# Patient Record
Sex: MALE | Race: WHITE | NOT HISPANIC OR LATINO | Employment: FULL TIME | URBAN - METROPOLITAN AREA
[De-identification: names, ages, dates, MRNs, and addresses within clinical notes are randomized per-mention and may not be internally consistent; named-entity substitution may affect disease eponyms.]

---

## 2023-01-03 ENCOUNTER — TELEPHONE (OUTPATIENT)
Dept: RHEUMATOLOGY | Facility: CLINIC | Age: 48
End: 2023-01-03
Payer: OTHER GOVERNMENT

## 2023-01-03 NOTE — TELEPHONE ENCOUNTER
Will contact patient when referral is received and reviewed  ----- Message from Desiree Sarah sent at 1/3/2023  1:55 PM CST -----  Type:  Sooner Apoointment Request    Caller is requesting a sooner appointment.  Caller declined first available appointment listed below.  Caller will not accept being placed on the waitlist and is requesting a message be sent to doctor.  Name of Caller:pt  When is the first available appointment?NA  Symptoms:new pt appt referred for abnormal lab results  Would the patient rather a call back or a response via [facility]? Call  Best Call Back Number:[phone]  Additional Information: referral is on its way

## 2023-05-30 ENCOUNTER — TELEPHONE (OUTPATIENT)
Dept: RHEUMATOLOGY | Facility: CLINIC | Age: 48
End: 2023-05-30

## 2023-05-30 ENCOUNTER — OFFICE VISIT (OUTPATIENT)
Dept: RHEUMATOLOGY | Facility: CLINIC | Age: 48
End: 2023-05-30
Payer: OTHER GOVERNMENT

## 2023-05-30 ENCOUNTER — LAB VISIT (OUTPATIENT)
Dept: LAB | Facility: HOSPITAL | Age: 48
End: 2023-05-30
Attending: INTERNAL MEDICINE
Payer: OTHER GOVERNMENT

## 2023-05-30 VITALS
HEART RATE: 57 BPM | SYSTOLIC BLOOD PRESSURE: 147 MMHG | WEIGHT: 196.88 LBS | HEIGHT: 67 IN | DIASTOLIC BLOOD PRESSURE: 94 MMHG | BODY MASS INDEX: 30.9 KG/M2

## 2023-05-30 DIAGNOSIS — F41.9 ANXIETY: ICD-10-CM

## 2023-05-30 DIAGNOSIS — G43.109 MIGRAINE WITH AURA AND WITHOUT STATUS MIGRAINOSUS, NOT INTRACTABLE: ICD-10-CM

## 2023-05-30 DIAGNOSIS — M79.7 FIBROMYALGIA: ICD-10-CM

## 2023-05-30 DIAGNOSIS — I83.90 VARICOSE VEINS OF LOWER EXTREMITY, UNSPECIFIED LATERALITY, UNSPECIFIED WHETHER COMPLICATED: ICD-10-CM

## 2023-05-30 DIAGNOSIS — M35.7 BENIGN JOINT HYPERMOBILITY SYNDROME: ICD-10-CM

## 2023-05-30 DIAGNOSIS — I83.813 VARICOSE VEINS OF BOTH LOWER EXTREMITIES WITH PAIN: ICD-10-CM

## 2023-05-30 DIAGNOSIS — R74.8 ELEVATED CK: Primary | ICD-10-CM

## 2023-05-30 DIAGNOSIS — F32.A DEPRESSION, UNSPECIFIED DEPRESSION TYPE: ICD-10-CM

## 2023-05-30 DIAGNOSIS — R76.8 POSITIVE ANA (ANTINUCLEAR ANTIBODY): ICD-10-CM

## 2023-05-30 DIAGNOSIS — M35.7 BENIGN HYPERMOBILITY SYNDROME: ICD-10-CM

## 2023-05-30 DIAGNOSIS — R76.8 POSITIVE ANA (ANTINUCLEAR ANTIBODY): Primary | ICD-10-CM

## 2023-05-30 DIAGNOSIS — M79.10 MYALGIA: ICD-10-CM

## 2023-05-30 PROBLEM — G43.909 MIGRAINES: Status: ACTIVE | Noted: 2023-05-30

## 2023-05-30 LAB
ALBUMIN SERPL BCP-MCNC: 4.3 G/DL (ref 3.5–5.2)
ALP SERPL-CCNC: 69 U/L (ref 55–135)
ALT SERPL W/O P-5'-P-CCNC: 59 U/L (ref 10–44)
ANION GAP SERPL CALC-SCNC: 10 MMOL/L (ref 8–16)
AST SERPL-CCNC: 40 U/L (ref 10–40)
BASOPHILS # BLD AUTO: 0.04 K/UL (ref 0–0.2)
BASOPHILS NFR BLD: 0.7 % (ref 0–1.9)
BILIRUB SERPL-MCNC: 0.5 MG/DL (ref 0.1–1)
BUN SERPL-MCNC: 12 MG/DL (ref 6–20)
C3 SERPL-MCNC: 134 MG/DL (ref 50–180)
C4 SERPL-MCNC: 26 MG/DL (ref 11–44)
CALCIUM SERPL-MCNC: 9.6 MG/DL (ref 8.7–10.5)
CCP AB SER IA-ACNC: 0.6 U/ML
CHLORIDE SERPL-SCNC: 106 MMOL/L (ref 95–110)
CK SERPL-CCNC: 702 U/L (ref 20–200)
CO2 SERPL-SCNC: 23 MMOL/L (ref 23–29)
CREAT SERPL-MCNC: 0.9 MG/DL (ref 0.5–1.4)
CRP SERPL-MCNC: 2.1 MG/L (ref 0–8.2)
DAT IGG-SP REAG RBC-IMP: NORMAL
DIFFERENTIAL METHOD: ABNORMAL
EOSINOPHIL # BLD AUTO: 0.1 K/UL (ref 0–0.5)
EOSINOPHIL NFR BLD: 1.2 % (ref 0–8)
ERYTHROCYTE [DISTWIDTH] IN BLOOD BY AUTOMATED COUNT: 12 % (ref 11.5–14.5)
ERYTHROCYTE [SEDIMENTATION RATE] IN BLOOD BY PHOTOMETRIC METHOD: 7 MM/HR (ref 0–23)
EST. GFR  (NO RACE VARIABLE): >60 ML/MIN/1.73 M^2
GLUCOSE SERPL-MCNC: 97 MG/DL (ref 70–110)
HBV CORE AB SERPL QL IA: NORMAL
HBV SURFACE AB SER-ACNC: 37.59 MIU/ML
HBV SURFACE AB SER-ACNC: REACTIVE M[IU]/ML
HBV SURFACE AG SERPL QL IA: NORMAL
HCT VFR BLD AUTO: 40.2 % (ref 40–54)
HCV AB SERPL QL IA: NORMAL
HGB BLD-MCNC: 13.7 G/DL (ref 14–18)
HIV 1+2 AB+HIV1 P24 AG SERPL QL IA: NORMAL
IGA SERPL-MCNC: 157 MG/DL (ref 40–350)
IGG SERPL-MCNC: 1000 MG/DL (ref 650–1600)
IGM SERPL-MCNC: 42 MG/DL (ref 50–300)
IMM GRANULOCYTES # BLD AUTO: 0.02 K/UL (ref 0–0.04)
IMM GRANULOCYTES NFR BLD AUTO: 0.4 % (ref 0–0.5)
LYMPHOCYTES # BLD AUTO: 1.6 K/UL (ref 1–4.8)
LYMPHOCYTES NFR BLD: 29 % (ref 18–48)
MCH RBC QN AUTO: 30 PG (ref 27–31)
MCHC RBC AUTO-ENTMCNC: 34.1 G/DL (ref 32–36)
MCV RBC AUTO: 88 FL (ref 82–98)
MONOCYTES # BLD AUTO: 0.5 K/UL (ref 0.3–1)
MONOCYTES NFR BLD: 8.1 % (ref 4–15)
NEUTROPHILS # BLD AUTO: 3.4 K/UL (ref 1.8–7.7)
NEUTROPHILS NFR BLD: 60.6 % (ref 38–73)
NRBC BLD-RTO: 0 /100 WBC
PLATELET # BLD AUTO: 221 K/UL (ref 150–450)
PMV BLD AUTO: 9.2 FL (ref 9.2–12.9)
POTASSIUM SERPL-SCNC: 3.8 MMOL/L (ref 3.5–5.1)
PROT SERPL-MCNC: 7.1 G/DL (ref 6–8.4)
RBC # BLD AUTO: 4.57 M/UL (ref 4.6–6.2)
SODIUM SERPL-SCNC: 139 MMOL/L (ref 136–145)
WBC # BLD AUTO: 5.65 K/UL (ref 3.9–12.7)

## 2023-05-30 PROCEDURE — 86160 COMPLEMENT ANTIGEN: CPT | Performed by: INTERNAL MEDICINE

## 2023-05-30 PROCEDURE — 85730 THROMBOPLASTIN TIME PARTIAL: CPT | Performed by: INTERNAL MEDICINE

## 2023-05-30 PROCEDURE — 86592 SYPHILIS TEST NON-TREP QUAL: CPT | Performed by: INTERNAL MEDICINE

## 2023-05-30 PROCEDURE — 86038 ANTINUCLEAR ANTIBODIES: CPT | Performed by: INTERNAL MEDICINE

## 2023-05-30 PROCEDURE — 86140 C-REACTIVE PROTEIN: CPT | Performed by: INTERNAL MEDICINE

## 2023-05-30 PROCEDURE — 99215 OFFICE O/P EST HI 40 MIN: CPT | Mod: PBBFAC | Performed by: INTERNAL MEDICINE

## 2023-05-30 PROCEDURE — 82784 ASSAY IGA/IGD/IGG/IGM EACH: CPT | Mod: 59 | Performed by: INTERNAL MEDICINE

## 2023-05-30 PROCEDURE — 99999 PR PBB SHADOW E&M-EST. PATIENT-LVL V: ICD-10-PCS | Mod: PBBFAC,,, | Performed by: INTERNAL MEDICINE

## 2023-05-30 PROCEDURE — 86147 CARDIOLIPIN ANTIBODY EA IG: CPT | Performed by: INTERNAL MEDICINE

## 2023-05-30 PROCEDURE — 85613 RUSSELL VIPER VENOM DILUTED: CPT | Performed by: INTERNAL MEDICINE

## 2023-05-30 PROCEDURE — 86880 COOMBS TEST DIRECT: CPT | Performed by: INTERNAL MEDICINE

## 2023-05-30 PROCEDURE — 99999 PR PBB SHADOW E&M-EST. PATIENT-LVL V: CPT | Mod: PBBFAC,,, | Performed by: INTERNAL MEDICINE

## 2023-05-30 PROCEDURE — 87389 HIV-1 AG W/HIV-1&-2 AB AG IA: CPT | Performed by: INTERNAL MEDICINE

## 2023-05-30 PROCEDURE — 99204 PR OFFICE/OUTPT VISIT, NEW, LEVL IV, 45-59 MIN: ICD-10-PCS | Mod: S$PBB,,, | Performed by: INTERNAL MEDICINE

## 2023-05-30 PROCEDURE — 86160 COMPLEMENT ANTIGEN: CPT | Mod: 59 | Performed by: INTERNAL MEDICINE

## 2023-05-30 PROCEDURE — 85652 RBC SED RATE AUTOMATED: CPT | Performed by: INTERNAL MEDICINE

## 2023-05-30 PROCEDURE — 86803 HEPATITIS C AB TEST: CPT | Performed by: INTERNAL MEDICINE

## 2023-05-30 PROCEDURE — 86200 CCP ANTIBODY: CPT | Performed by: INTERNAL MEDICINE

## 2023-05-30 PROCEDURE — 86162 COMPLEMENT TOTAL (CH50): CPT | Performed by: INTERNAL MEDICINE

## 2023-05-30 PROCEDURE — 86704 HEP B CORE ANTIBODY TOTAL: CPT | Performed by: INTERNAL MEDICINE

## 2023-05-30 PROCEDURE — 80053 COMPREHEN METABOLIC PANEL: CPT | Performed by: INTERNAL MEDICINE

## 2023-05-30 PROCEDURE — 82085 ASSAY OF ALDOLASE: CPT | Performed by: INTERNAL MEDICINE

## 2023-05-30 PROCEDURE — 86334 IMMUNOFIX E-PHORESIS SERUM: CPT | Mod: 26,,, | Performed by: PATHOLOGY

## 2023-05-30 PROCEDURE — 86334 PATHOLOGIST INTERPRETATION IFE: ICD-10-PCS | Mod: 26,,, | Performed by: PATHOLOGY

## 2023-05-30 PROCEDURE — 85025 COMPLETE CBC W/AUTO DIFF WBC: CPT | Performed by: INTERNAL MEDICINE

## 2023-05-30 PROCEDURE — 86706 HEP B SURFACE ANTIBODY: CPT | Performed by: INTERNAL MEDICINE

## 2023-05-30 PROCEDURE — 86334 IMMUNOFIX E-PHORESIS SERUM: CPT | Performed by: INTERNAL MEDICINE

## 2023-05-30 PROCEDURE — 99204 OFFICE O/P NEW MOD 45 MIN: CPT | Mod: S$PBB,,, | Performed by: INTERNAL MEDICINE

## 2023-05-30 PROCEDURE — 87340 HEPATITIS B SURFACE AG IA: CPT | Performed by: INTERNAL MEDICINE

## 2023-05-30 PROCEDURE — 86480 TB TEST CELL IMMUN MEASURE: CPT | Performed by: INTERNAL MEDICINE

## 2023-05-30 PROCEDURE — 86146 BETA-2 GLYCOPROTEIN ANTIBODY: CPT | Mod: 59 | Performed by: INTERNAL MEDICINE

## 2023-05-30 PROCEDURE — 86235 NUCLEAR ANTIGEN ANTIBODY: CPT | Performed by: INTERNAL MEDICINE

## 2023-05-30 PROCEDURE — 82550 ASSAY OF CK (CPK): CPT | Performed by: INTERNAL MEDICINE

## 2023-05-30 RX ORDER — ESCITALOPRAM OXALATE 20 MG/1
20 TABLET ORAL
COMMUNITY
Start: 2023-03-13

## 2023-05-30 RX ORDER — AMITRIPTYLINE HYDROCHLORIDE 25 MG/1
25 TABLET, FILM COATED ORAL
COMMUNITY
Start: 2023-04-21 | End: 2023-09-18

## 2023-05-30 RX ORDER — AMLODIPINE BESYLATE 5 MG/1
5 TABLET ORAL
COMMUNITY
Start: 2023-04-29 | End: 2023-09-18

## 2023-05-30 ASSESSMENT — ROUTINE ASSESSMENT OF PATIENT INDEX DATA (RAPID3)
PSYCHOLOGICAL DISTRESS SCORE: 8.8
PAIN SCORE: 8
PATIENT GLOBAL ASSESSMENT SCORE: 6
TOTAL RAPID3 SCORE: 5.55
FATIGUE SCORE: 5
AM STIFFNESS SCORE: 1, YES
WHEN YOU AWAKENED IN THE MORNING OVER THE LAST WEEK, PLEASE INDICATE THE AMOUNT OF TIME IT TAKES UNTIL YOU ARE AS LIMBER AS YOU WILL BE FOR THE DAY: 30 MIN
MDHAQ FUNCTION SCORE: 0.8

## 2023-05-30 NOTE — PROGRESS NOTES
"Subjective:      Patient ID: Dustin Pabon is a 48 y.o. male.    Chief Complaint: ELIZABETH+ Disease Management    Patient was referred by Dr. Chawla for ELIZABETH(+), RF(-), burning pain and paresthesias to extremities distal starting at elbows and knees. Labs from 12/22/2022 showed unremarkable CBC, CMP. Also within normal limits were ESR, CRP, TSH, Vitamin B12, Folate, Serum Protein Electrophoresis, A1c 5.4.    He describes waking up with pain to wrists, hands, and legs with paresthesias and "fall asleep"  when he sits or stands too long. This has been going on for about 12-18 months, occurs daily. The leg paresthesias will take a while to resolve once he walks around. Knots to Left lateral leg and Right medial leg if sits too long or if he works out, runs; they go away when he changes his activity. Has not seen vascular surgery but does follow with cardiology for HTN. Muscle spasm to bilateral calves when walks. His hands have been swelling, even when he has been on low sodium. Neck and shoulder stiffness that is worse in morning but happens during the day as well; resolves with stretching. He has not had EMG/NCS. Headaches are improved on Amitriptyline.     Exercise: He is very active and exercises every day.   Diet: Low sodium                                  Widespread pain index  Note the areas which the patient has had pain over the last week:                   Shoulder-girdle, left               Shoulder-girdle, right 1                         Upper arm left                       Upper arm right                         Lower arm left 1                       Lower arm right 1    Hip (buttock, trochanter) left  Hip (buttock, trochanter) right                           Upper leg, left 1                         Upper leg, right                           Lower leg, left 1                         Lower leg, right 1                                     Jaw, left                                   Jaw, right                  "                       Chest                                  Abdomen                               Upper back                              Lower back 1                                        Neck 1  Score will be from 0-19: 8                                         Symptom severity score  Fatigue  1  Waking Unrefreshed 2  Cognitive Symptoms 2   0 = no problem, 1=slight or mild problem 2= moderate; considerable problems often present and/or at a moderate level, 3 = severe, pervasive, continuous, life disturbing problem  For each of the 3 symptoms, indicate the level of severity over the past week using the Scale.  The symptom severity score is the sum of the severity of the 3 symptoms (fatigue, waking unrefreshed, and cognitive symptoms) plus the number of the following symptoms occurring during the previous 6 months:   Headaches 1  Pain or cramps in the lower abdomen 0  Depression 1  The final score is between 0 and 12 : 7                                          Criteria  Patient has fibromyalgia if the following 3 conditions are met:  1.  Widespread pain index greater than or equal to 7 and symptom severity score greater than or equal to 5 or widespread pain index between 3- 6, and symptom severity score greater than or equal to 9.    2.  Symptoms have been present in a similar level for at least 3 months  3.  The patient does not have a disorder that would otherwise sufficiently explain the pain     Review of Systems   Constitutional:  Positive for fatigue. Negative for fever.   HENT:  Negative for rhinorrhea, sneezing and sore throat.    Cardiovascular:  Positive for leg swelling.   Gastrointestinal:  Negative for constipation, diarrhea, nausea and vomiting.   Endocrine: Positive for heat intolerance. Negative for cold intolerance.   Genitourinary:  Negative for dysuria and hematuria.   Musculoskeletal:  Positive for arthralgias and myalgias.   Skin:  Negative for rash.   Neurological:  Positive for numbness and  "headaches. Negative for seizures.   Psychiatric/Behavioral:  Negative for agitation.       Objective:   BP (!) 147/94   Pulse (!) 57   Ht 5' 7" (1.702 m)   Wt 89.3 kg (196 lb 13.9 oz)   BMI 30.83 kg/m²     Physical Exam   Constitutional: He is oriented to person, place, and time. normal appearance. No distress.   HENT:   Head: Normocephalic.   Nose: No rhinorrhea or nasal congestion.   Mouth/Throat: Mucous membranes are moist. No oropharyngeal exudate or posterior oropharyngeal erythema. Oropharynx is clear.   Eyes: Conjunctivae are normal.   Cardiovascular: Normal rate, regular rhythm and normal heart sounds.   Vericose veins to Left leg   Pulmonary/Chest: Effort normal and breath sounds normal.   Abdominal: Soft. Bowel sounds are normal. He exhibits mass (Few nodules to mid and left abdomen; following with dermatology for lipomas). He exhibits no distension. There is abdominal tenderness (to nodules). There is no guarding.   Musculoskeletal:         General: No swelling or tenderness.      Cervical back: Normal range of motion.      Right lower leg: No edema.      Left lower leg: No edema.      Comments: Nodules to Left lateral leg  Hypermobility to Bilateral elbows, first digits  Increased ROM diffusely   Neurological: He is alert and oriented to person, place, and time.   Skin: Skin is warm and dry. Lesion (cutting lesions to arms legs) noted. No rash noted. No erythema.   Psychiatric: His behavior is normal. Mood normal.      5/30/2023   Tender (VAUGHAN-28) 0 / 28    Swollen (VAUGHAN-28) 0 / 28    Provider Global --   Patient Global --   ESR --   CRP --   VAUGHAN-28 (ESR) --   VAUGHAN-28 (CRP) --   CDAI Score --        Assessment:     1. Positive ELIZABETH (antinuclear antibody)    2. Hypermobility of joint    3. Varicose veins of both lower extremities with pain    4. Benign hypermobility syndrome    5. Myalgia    6. Fibromyalgia      Plan:     Problem List Items Addressed This Visit    None  Visit Diagnoses       Positive ELIZABETH " (antinuclear antibody)    -  Primary    Relevant Orders    ELIZABETH Screen w/Reflex    Cyclic Citrullinated Peptide Antibody, IgG    Sjogrens syndrome-A extractable nuclear antibody    HIV-1 and HIV-2 antibodies    Hepatitis B surface antigen    HBcAB    Hepatitis B surface antibody    Hepatitis C antibody    Quantiferon Gold TB    RPR    Direct antiglobulin test    CK    Aldolase    Urinalysis    Protein/Creatinine Ratio, Urine    C3 Complement    C4 Complement    Complement, Total    DRVVT    Cardiolipin antibody    Beta-2 Glycoprotein Abs (IgA, IgG, IgM)    IMMUNOGLOBULINS (IGG, IGA, IGM) QUANTITATIVE    Immunofixation Electrophoresis    CBC Auto Differential    Comprehensive Metabolic Panel    Sedimentation rate    C-Reactive Protein    Ambulatory referral/consult to Ophthalmology    Hypermobility of joint        Relevant Orders    Echo    Ambulatory referral/consult to Physical/Occupational Therapy    Varicose veins of both lower extremities with pain        Relevant Orders    Ambulatory referral/consult to Vascular Surgery    Benign hypermobility syndrome        Myalgia              - Please complete labs  - Referred to Vascular Surgery for vericose veins  - Referred to PT for hypermobility  - Echo ordered today  - Referred to Ophthalmology for connective tissue disorder screen  - Return to clinic in 3 months

## 2023-05-30 NOTE — TELEPHONE ENCOUNTER
Please schedule anemia labs, CK ALT  Myomarker 3 HMG anti-IBM antibody labs in 2 wks. Please tell him would like him to refrain from exercise for 1 wk before the 2wks labs  Also please schedule EMG/NCV thank you CASSY

## 2023-05-30 NOTE — PROGRESS NOTES
"I have personally taken the history and examined the patient and agree with the resident,s note as stated above            ELIZABETH+ 12/21/22 no further analysis rest of labs at that time normal(CBC, CMP  CRP 1 mg/L ESR 8 , SPEP TSH HbA1c RF neg, B12 nl  Headaches, including Migraine headaches without aura but with photophobia, nausea  Numbness and burning in extremities x several years initially hands now forearms and also legs below knees  Fibromyalgia  WPI 8  SSS 7  Benign hypermobility syndrome Beighton 9/9 criteria  Large varicose veins  Normal peripheral pulses   No murmur  "Cutter" multiple scars wrists, forearms, upper arms, thighs and legs  Dystrophic nails from picking  EH  H/o anxiety/depression  H/o irregular heart beat    ELIZABETH, CBC, CMP, ESR, CRP, SIFE, Igs, C3, C4 CH50, CASEY, CK ACPA HBsAg, HBcAB, HBsAB, HCV, HIV  RPR, QG-TB APLS panel to assess ELIZABETH  UA UPCR   TTE to check for valvular disease with hypermobility  Ref to Vasc Surg for varicose veins  Ref to Ophthalmology for eye exam given hypermobility  Ref to PT for isometrics  Fibromyalgia packet provided  Start yoga as instructor now @ [employer]  Also consider Alin Chi  Mediterranean diet   F/u with Dr. Chawla for paresthesiae, consider EMG/NCV, skin biopsy for SNF, brain MRI  RTC 3 months        "

## 2023-05-30 NOTE — PATIENT INSTRUCTIONS
- Please complete labs  - Referred to Vascular Surgery for vericose veins  - Referred to PT for hypermobility  - Echo ordered today  - Referred to Ophthalmology for connective tissue disorder screen  - Return to clinic in 3 months

## 2023-05-31 ENCOUNTER — TELEPHONE (OUTPATIENT)
Dept: RHEUMATOLOGY | Facility: CLINIC | Age: 48
End: 2023-05-31
Payer: OTHER GOVERNMENT

## 2023-05-31 LAB
ALDOLASE SERPL-CCNC: 12 U/L (ref 1.2–7.6)
ANA SER QL IF: NORMAL
ANTI-SSA ANTIBODY: 0.07 RATIO (ref 0–0.99)
ANTI-SSA INTERPRETATION: NEGATIVE
GAMMA INTERFERON BACKGROUND BLD IA-ACNC: 0 IU/ML
INTERPRETATION SERPL IFE-IMP: NORMAL
M TB IFN-G CD4+ BCKGRND COR BLD-ACNC: 0 IU/ML
MITOGEN IGNF BCKGRD COR BLD-ACNC: 10 IU/ML
RPR SER QL: NORMAL
TB GOLD PLUS: NEGATIVE
TB2 - NIL: 0.02 IU/ML

## 2023-05-31 NOTE — TELEPHONE ENCOUNTER
Placed the labs in the mail with instructions and the patient already has all of the referrals for

## 2023-06-01 DIAGNOSIS — I83.893 VARICOSE VEINS OF LEG WITH EDEMA, BILATERAL: Primary | ICD-10-CM

## 2023-06-01 LAB
APTT IMM NP PPP: ABNORMAL SEC (ref 32–48)
APTT P HEP NEUT PPP: ABNORMAL SEC (ref 32–48)
CONFIRM APTT STACLOT: ABNORMAL
DRVVT SCREEN TO CONFIRM RATIO: ABNORMAL RATIO
LA 3 SCREEN W REFLEX-IMP: ABNORMAL
LA NT DPL PPP QL: ABNORMAL
MIXING DRVVT: ABNORMAL SEC (ref 33–44)
PATHOLOGIST INTERPRETATION IFE: NORMAL
PROTHROMBIN TIME: 12.1 SEC (ref 12–15.5)
REPTILASE TIME: ABNORMAL SEC
SCREEN APTT: 36 SEC (ref 32–48)
SCREEN DRVVT: 30 SEC (ref 33–44)
THROMBIN TIME: ABNORMAL SEC (ref 14.7–19.5)

## 2023-06-02 LAB
B2 GLYCOPROT1 IGA SER QL: 1.4 U/ML
B2 GLYCOPROT1 IGG SER QL: <0.8 U/ML
B2 GLYCOPROT1 IGM SER QL: <2.4 U/ML
CARDIOLIPIN IGG SER IA-ACNC: <9.4 GPL (ref 0–14.99)
CARDIOLIPIN IGM SER IA-ACNC: <9.4 MPL (ref 0–12.49)
CH50 SERPL-ACNC: 63 U/ML (ref 42–95)

## 2023-06-07 ENCOUNTER — TELEPHONE (OUTPATIENT)
Dept: NEUROLOGY | Facility: CLINIC | Age: 48
End: 2023-06-07
Payer: OTHER GOVERNMENT

## 2023-06-07 NOTE — TELEPHONE ENCOUNTER
Scheduled patient for EMG for June 20th for 3pm.   ----- Message from Adriana Silva sent at 6/7/2023  1:59 PM CDT -----  Regarding: Patient call back  Who called:REKHA MCCARTHY [[id number]]    What is the request in detail: Patient is requesting a call back. Pt would like to schedule his EMG   Please advise.    Can the clinic reply by [facility]? No    Best call back number: [phone]    Additional Information: N/A

## 2023-06-08 ENCOUNTER — PATIENT MESSAGE (OUTPATIENT)
Dept: RHEUMATOLOGY | Facility: CLINIC | Age: 48
End: 2023-06-08
Payer: OTHER GOVERNMENT

## 2023-06-20 ENCOUNTER — PROCEDURE VISIT (OUTPATIENT)
Dept: NEUROLOGY | Facility: CLINIC | Age: 48
End: 2023-06-20
Payer: OTHER GOVERNMENT

## 2023-06-20 DIAGNOSIS — R74.8 ELEVATED CK: ICD-10-CM

## 2023-06-20 PROCEDURE — 95886 MUSC TEST DONE W/N TEST COMP: CPT | Mod: PBBFAC | Performed by: PHYSICAL MEDICINE & REHABILITATION

## 2023-06-20 PROCEDURE — 95913 PR NERVE CONDUCTION STUDY; 13 OR MORE STUDIES: ICD-10-PCS | Mod: 26,S$PBB,, | Performed by: PHYSICAL MEDICINE & REHABILITATION

## 2023-06-20 PROCEDURE — 95886 PR EMG COMPLETE, W/ NERVE CONDUCTION STUDIES, 5+ MUSCLES: ICD-10-PCS | Mod: 26,S$PBB,, | Performed by: PHYSICAL MEDICINE & REHABILITATION

## 2023-06-20 PROCEDURE — 95885 MUSC TST DONE W/NERV TST LIM: CPT | Mod: PBBFAC | Performed by: PHYSICAL MEDICINE & REHABILITATION

## 2023-06-20 PROCEDURE — 95913 NRV CNDJ TEST 13/> STUDIES: CPT | Mod: 26,S$PBB,, | Performed by: PHYSICAL MEDICINE & REHABILITATION

## 2023-06-20 PROCEDURE — 95886 MUSC TEST DONE W/N TEST COMP: CPT | Mod: 26,S$PBB,, | Performed by: PHYSICAL MEDICINE & REHABILITATION

## 2023-06-20 PROCEDURE — 95885 MUSC TST DONE W/NERV TST LIM: CPT | Mod: 26,59,S$PBB, | Performed by: PHYSICAL MEDICINE & REHABILITATION

## 2023-06-20 PROCEDURE — 95885 PR MUSC TST DONE W/NERV TST LIM: ICD-10-PCS | Mod: 26,59,S$PBB, | Performed by: PHYSICAL MEDICINE & REHABILITATION

## 2023-06-20 PROCEDURE — 95913 NRV CNDJ TEST 13/> STUDIES: CPT | Mod: PBBFAC | Performed by: PHYSICAL MEDICINE & REHABILITATION

## 2023-06-20 NOTE — PROGRESS NOTES
The EMG/NCV of the arms and legs was normal. Would f/u with Dr. Chawla your Neurologist for these symptoms RJQ    Impression:  This was a relatively normal electrophysiologic study of the upper and lower extremities.  The left median motor nerve study showed borderline amplitude at the wrist only, which I suspect to be technical given the pattern of normal responses with palm and elbow stimulation and normal needle EMG.  No definitive evidence of an anomalous innervation to explain this.  Aside from this, no evidence of a large fiber neuropathy and no evidence of myopathy seen.  Note that symmetric burning pain can sometimes be seen with small fiber neuropathies, which NCS/EMG will not , so this remains possible.  Small fiber neuropathy is also associated with fibromyalgia.       ___________________________  Jerad Jung D.O.

## 2023-06-20 NOTE — PROCEDURES
Test Date:  2023    Patient: dustin angulo : 1975 Physician: Jerad Jung D.O.   ID#: [id number] SEX: Male Ref. Phys: Hayden Pardo MD     HPI: Dustin Angulo is a 48 y.o.male who presents for NCS/EMG to evaluate burning and numbness of the bilateral feet and hands.  Also, with elevated CK at 702 U/L, so requested evaluation for myopathy. He denies any weakness, though states he has been dropping things from the hands.      NCV & EMG Findings:  Evaluation of the left median motor nerve showed borderline reduced amplitude.  All remaining nerves (as indicated in the following tables) were within normal limits.  All examined muscles (as indicated in the following table) showed no evidence of electrical instability.    Impression:  This was a relatively normal electrophysiologic study of the upper and lower extremities.  The left median motor nerve study showed borderline amplitude at the wrist only, which I suspect to be technical given the pattern of normal responses with palm and elbow stimulation and normal needle EMG.  No definitive evidence of an anomalous innervation to explain this.  Aside from this, no evidence of a large fiber neuropathy and no evidence of myopathy seen.  Note that symmetric burning pain can sometimes be seen with small fiber neuropathies, which NCS/EMG will not , so this remains possible.  Small fiber neuropathy is also associated with fibromyalgia.      ___________________________  Jerad Jung D.O.        NCS+  Motor Nerve Results      Latency Amplitude F-Lat Segment Distance CV Comment   Site (ms) Norm (mV) Norm (ms)  (cm) (m/s) Norm    Left Median (APB)   Palm - - - -         Wrist 3.4  < 4.6 *4.1  > 4.2  Wrist-Palm - - -    Wrist (U) 2.5 - 8.8 -      pos def   Elbow 7.7 - 7.2 -  Elbow-Wrist - -  > 47    Elbow (U) 6.6 - 3.4 -  Elbow (U)-Wrist (U) - - - pos def   Right Median (APB)   Wrist 3.4  < 4.6 5.3  > 4.2  Wrist-Palm - - -    Elbow 7.6 - 7.5  -  Elbow-Wrist 22 52  > 47    Left Ulnar (ADM)   Wrist 2.8  < 3.7 8.4  > 3.0         Bel Elbow 6.6 - 7.4 -  Bel Elbow-Wrist 25 66  > 52    Abv Elbow 7.8 - 7.1 -  Abv Elbow-Bel Elbow 8.5 71  > 43    Right Ulnar (ADM)   Wrist 2.6  < 3.7 9.1  > 3.0         Bel Elbow 6.6 - 9.0 -  Bel Elbow-Wrist 24 60  > 52    Abv Elbow 8.0 - 8.9 -  Abv Elbow-Bel Elbow 10 71  > 43    Left Fibular (EDB)   Ankle 2.4  < 6.5 5.7  > 1.10         Bel Fib Head 10.7 - 5.4 -  Bel Fib Head-Ankle 38 46 -    Pop Fossa 12.1 - 5.5 -  Pop Fossa-Bel Fib Head 8 57  > 42    Right Fibular (EDB)   Ankle 3.5  < 6.5 6.2  > 1.10         Bel Fib Head 10.0 - 5.2 -  Bel Fib Head-Ankle 33 51 -    Pop Fossa 11.8 - 5.0 -  Pop Fossa-Bel Fib Head 12 67  > 42    Left Tibial (AHB)   Ankle 5.0  < 6.1 12.2  > 5.3 47.8        Right Tibial (AHB)   Ankle 4.8  < 6.1 10.2  > 5.3           Sensory Nerve Results      Latency (Peak) Amplitude (P-P) Segment Distance CV Comment   Site (ms) Norm (µV) Norm  (cm) (m/s) Norm    Left Median   Wrist-Dig II 3.4  < 4.0 40  > 13 Wrist-Dig II 14 41  > 39    Right Median   Wrist-Dig I 2.8 - 36 - Wrist-Dig I 10 36 -    Wrist-Dig II 3.1  < 4.0 46  > 13 Wrist-Dig II 14 45  > 39    Palm-Wrist 1.85 - 32 - Palm-Wrist - - -    Left Ulnar   Wrist-Dig V 2.9  < 4.0 43  > 8 Wrist-Dig V 14 48  > 38    Right Ulnar   Wrist-Dig V 3.0  < 4.0 38  > 8 Wrist-Dig V 14 47  > 38    Palm-Wrist 2.1 - 18 - Palm-Wrist - - -    Right Radial   Wrist-Dig I 2.9 - 10 - Wrist-Dig I 10 34 -    Left Sural   Calf-Lat Mall 2.6  < 4.5 30  > 4 Calf-Lat Mall 14 54  > 35    Right Sural   Calf-Lat Mall 2.4  < 4.5 26  > 4 Calf-Lat Mall 14 58  > 35    Right Superficial Fibular   14 cm-Ankle 2.6  < 4.2 31  > 5 14 cm-Ankle 14 54  > 32      Inter-Nerve Comparisons     Nerve 1 Value 1 Nerve 2 Value 2 Parameter Result Normal   Sensory Sites   R Median Wrist-Dig I 2.8 ms R Radial Wrist-Dig I 2.9 ms Peak Lat Diff 0.10 ms <0.40   R Median Palm-Wrist 1.9 ms R Ulnar Palm-Wrist 2.1 ms Peak Lat  Diff 0.25 ms <0.30     EMG+     Side Muscle Nerve Root Ins Act Fibs Psw Amp Dur Poly Recrt Int Pat Comment   Right Vastus Med Femoral L2-L4 Nml Nml Nml Nml Nml 0 Nml Nml    Right Tib Anterior Fibular,  Deep Fibula... L4-L5 Nml Nml Nml Nml Nml 0 Nml Nml    Right Fib Longus Fibular,  Superficial... L5-S1 Nml Nml Nml Nml Nml 0 Nml Nml    Right Gastroc Tibial S1-S2 Nml Nml Nml Nml Nml 0 Nml Nml    Right Dorsal Interossei ped I Lateral Plantar S2-S3 Nml Nml Nml Nml Nml 0 Nml Nml    Left Vastus Med Femoral L2-L4 Nml Nml Nml Nml Nml 0 Nml Nml    Left Tib Anterior Fibular,  Deep Fibula... L4-L5 Nml Nml Nml Nml Nml 0 Nml Nml    Left Fib Longus Fibular,  Superficial... L5-S1 Nml Nml Nml Nml Nml 0 Nml Nml    Left Gastroc Tibial S1-S2 Nml Nml Nml Nml Nml 0 Nml Nml    Left Dorsal Interossei ped I Lateral Plantar S2-S3 Nml Nml Nml Nml Nml 0 Nml Nml    Left APB Median C8-T1 Nml Nml Nml Nml Nml 0 Nml Nml    Left Pronator Teres Median C6-C7 Nml Nml Nml Nml Nml 0 Nml Nml            Waveforms:    Motor                         F-Wave     Sensory

## 2023-09-18 ENCOUNTER — OFFICE VISIT (OUTPATIENT)
Dept: RHEUMATOLOGY | Facility: CLINIC | Age: 48
End: 2023-09-18
Payer: OTHER GOVERNMENT

## 2023-09-18 VITALS
DIASTOLIC BLOOD PRESSURE: 80 MMHG | BODY MASS INDEX: 29.35 KG/M2 | SYSTOLIC BLOOD PRESSURE: 131 MMHG | HEART RATE: 64 BPM | HEIGHT: 67 IN | WEIGHT: 187 LBS

## 2023-09-18 DIAGNOSIS — R74.8 ELEVATED CK: Primary | ICD-10-CM

## 2023-09-18 DIAGNOSIS — R53.83 FATIGUE, UNSPECIFIED TYPE: ICD-10-CM

## 2023-09-18 DIAGNOSIS — R74.01 ELEVATED ALT MEASUREMENT: ICD-10-CM

## 2023-09-18 PROCEDURE — 99999 PR PBB SHADOW E&M-EST. PATIENT-LVL III: ICD-10-PCS | Mod: PBBFAC,,, | Performed by: INTERNAL MEDICINE

## 2023-09-18 PROCEDURE — 99999 PR PBB SHADOW E&M-EST. PATIENT-LVL III: CPT | Mod: PBBFAC,,, | Performed by: INTERNAL MEDICINE

## 2023-09-18 PROCEDURE — 99213 OFFICE O/P EST LOW 20 MIN: CPT | Mod: PBBFAC | Performed by: INTERNAL MEDICINE

## 2023-09-18 PROCEDURE — 99213 PR OFFICE/OUTPT VISIT, EST, LEVL III, 20-29 MIN: ICD-10-PCS | Mod: S$PBB,,, | Performed by: INTERNAL MEDICINE

## 2023-09-18 PROCEDURE — 99213 OFFICE O/P EST LOW 20 MIN: CPT | Mod: S$PBB,,, | Performed by: INTERNAL MEDICINE

## 2023-09-18 RX ORDER — OLMESARTAN MEDOXOMIL 20 MG/1
40 TABLET ORAL DAILY
COMMUNITY
Start: 2023-09-08

## 2023-09-18 RX ORDER — MELOXICAM 15 MG/1
15 TABLET ORAL DAILY
COMMUNITY

## 2023-09-18 RX ORDER — AMITRIPTYLINE HYDROCHLORIDE 50 MG/1
50 TABLET, FILM COATED ORAL NIGHTLY
COMMUNITY

## 2023-09-18 NOTE — PROGRESS NOTES
9/18/2023    11:37 AM   Rapid3 Question Responses and Scores   MDHAQ Score 0.5   Psychologic Score 6.6   Pain Score 6   When you awakened in the morning OVER THE LAST WEEK, did you feel stiff? Yes   If Yes, please indicate the number of hours until you are as limber as you will be for the day 1   Fatigue Score 5   Global Health Score 6   RAPID3 Score 4.56     Answers submitted by the patient for this visit:  Rheumatology Questionnaire (Submitted on 9/18/2023)  fever: No  eye redness: No  mouth sores: No  headaches: Yes  shortness of breath: No  chest pain: No  trouble swallowing: No  diarrhea: Yes  constipation: No  unexpected weight change: No  genital sore: No  During the last 3 days, have you had a skin rash?: No  Bruises or bleeds easily: No  cough: No

## 2023-09-18 NOTE — PATIENT INSTRUCTIONS
Blood work next week after at least 7 days rest from exercise  Trial of gabapentin 100 mg TID  RTC in 3 months

## 2023-09-18 NOTE — PROGRESS NOTES
Patient ID:  Dustin Pabon    YOB: 1975     MRN:  [id number]     Subjective:     Chief Complaint:  polyneuropathy, fibromyalgia     History of Present Illness:  Patient is a 48 y.o. male with polyneuropathy, fibromyalgia who presents today for follow up. LCV was 5/2023. At that time, he was worked up for a neuropathy and referred to neurology for an EMG/NCS which was within normal limits.    Today: Today, the patient reports continued burning / tingling with occasional numbness in the arms distal to the elbows and legs distal to the knees. He states that these sensations are worsened with prolonged inactivity such as when he is driving. He also states that his legs become numb when he is running. He is scheduled to see vascular surgery on 10/3 at [facility] for workup of varicose veins. His lab work from 5/30 showed a CK level of 702. He is currently running every day and does PT twice per week, primarily for back pain. He  is on amitriptyline 50 mg daily for migraines.     Denies  hair loss, dry eyes, vision changes, dry mouth, oral/nasal ulcers, trouble swallowing, new rashes, joint swelling, morning stiffness, or GI disturbances.      Review of Systems   Review of Systems   Constitutional:  Negative for fever and unexpected weight change.   HENT:  Negative for mouth sores and trouble swallowing.    Eyes:  Negative for redness.   Respiratory:  Negative for cough and shortness of breath.    Cardiovascular:  Negative for chest pain.   Gastrointestinal:  Positive for diarrhea. Negative for constipation.   Genitourinary:  Negative for genital sores.   Skin:  Negative for rash.   Neurological:  Positive for headaches.   Hematological:  Does not bruise/bleed easily.        Current Medications:    Current Outpatient Medications:     amitriptyline (ELAVIL) 50 MG tablet, Take 50 mg by mouth every evening., Disp: , Rfl:     EScitalopram oxalate (LEXAPRO) 20 MG tablet, Take 20 mg by mouth., Disp: ,  Rfl:     meloxicam (MOBIC) 15 MG tablet, Take 15 mg by mouth once daily., Disp: , Rfl:     olmesartan (BENICAR) 20 MG tablet, Take 40 mg by mouth once daily., Disp: , Rfl:      Objective:     Vitals:    09/18/23 1254   BP: 131/80   Pulse: 64      Body mass index is 29.29 kg/m².     Physical Exam   Constitutional: normal appearance. No distress.   HENT:   Head: Normocephalic and atraumatic.   Eyes: Right eye exhibits no discharge. Left eye exhibits no discharge. No scleral icterus.   Cardiovascular: Normal rate and regular rhythm. Exam reveals no gallop and no friction rub.   Murmur heard.  2/6 systolic murmur appreciated   Pulmonary/Chest: Effort normal and breath sounds normal. No respiratory distress. He has no wheezes. He has no rhonchi. He has no rales.   Abdominal: Soft. He exhibits no distension. There is no abdominal tenderness.   Musculoskeletal:         General: Normal range of motion.      Right shoulder: Normal.      Left shoulder: Normal.      Right elbow: Normal.      Left elbow: Normal.      Right wrist: Normal.      Left wrist: Normal.      Cervical back: Normal range of motion. No tenderness.      Right knee: Normal.      Left knee: Normal.   Neurological: He is alert.   Reflex Scores:       Patellar reflexes are 2+ on the right side and 2+ on the left side.      Right Side Rheumatological Exam     Examination finds the shoulder, elbow, wrist, knee, 1st PIP, 1st MCP, 2nd PIP, 2nd MCP, 3rd PIP, 3rd MCP, 4th PIP, 4th MCP, 5th PIP and 5th MCP normal.    Muscle Strength (0-5 scale):  Deltoid:  5  Biceps: 5/5   Triceps:  5  Iliopsoas: 5  Quadriceps:  5     Left Side Rheumatological Exam     Examination finds the shoulder, elbow, wrist, knee, 1st PIP, 1st MCP, 2nd PIP, 2nd MCP, 3rd PIP, 3rd MCP, 4th PIP, 4th MCP, 5th PIP and 5th MCP normal.    Muscle Strength (0-5 scale):  Deltoid:  5  Biceps: 5/5   Triceps:  5  Iliopsoas: 5  Quadriceps:  5            5/30/2023 9/18/2023   Tender (VAUGHAN-28) 0 / 28 0 / 28   "  Swollen (VAUGHAN-28) 0 / 28  0 / 28    Provider Global -- --   Patient Global -- 60 mm   ESR -- --   CRP -- --   VAUGHAN-28 (ESR) -- --   VAUGHAN-28 (CRP) -- --   CDAI Score -- --              Assessment:     ELIZABETH+ 12/21/22 no further analysis rest of labs at that time normal(CBC, CMP  CRP 1 mg/L ESR 8 , SPEP TSH HbA1c RF neg, B12 nl  ELIZABETH now -, rest of lupus labs -  Headaches, including Migraine headaches without aura but with photophobia, nausea  Numbness and burning in extremities x several years initially hands now forearms and also legs below knees  Fibromyalgia  WPI 8  SSS 7  Benign hypermobility syndrome Beighton 9/9 criteria  Large varicose veins  Normal peripheral pulses   2/6 systolic murmur   "Cutter" multiple scars wrists, forearms, upper arms, thighs and legs  Dystrophic nails from picking  /80  H/o anxiety/depression  H/o irregular heart beat  Mild anemia  Increased ALT 59   aldolase 12  normal muscle strength 5/5 UE and LE  normal EMG     Plan:      TSH, free T4 repeat CK, aldolase, ALT, AST, LD, HMGCR, anti-IBM, Myomarker 3, ferritin, Fe/TIBC ordered to be drawn  after one week without exercise.   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 Chawla in Neurology  for paresthesiae,  skin biopsy for SNF, brain MRI  Continue amitriptyline 50mg hs  Trial adding gabapentin 100mg three times vik  RTC 3 months     Bronson Duran M.D.  PGY-1  [employer] [facility]      "

## 2023-09-18 NOTE — PROGRESS NOTES
I have personally taken the history and examined the patient and agree with the resident,s note as stated above         Latest Reference Range & Units 05/30/23 11:51   WBC 3.90 - 12.70 K/uL 5.65   RBC 4.60 - 6.20 M/uL 4.57 (L)   Hemoglobin 14.0 - 18.0 g/dL 13.7 (L)   Hematocrit 40.0 - 54.0 % 40.2   MCV 82 - 98 fL 88   MCH 27.0 - 31.0 pg 30.0   MCHC 32.0 - 36.0 g/dL 34.1   RDW 11.5 - 14.5 % 12.0   Platelets 150 - 450 K/uL 221   MPV 9.2 - 12.9 fL 9.2   Gran % 38.0 - 73.0 % 60.6   Lymph % 18.0 - 48.0 % 29.0   Mono % 4.0 - 15.0 % 8.1   Eosinophil % 0.0 - 8.0 % 1.2   Basophil % 0.0 - 1.9 % 0.7   Immature Granulocytes 0.0 - 0.5 % 0.4   Gran # (ANC) 1.8 - 7.7 K/uL 3.4   Lymph # 1.0 - 4.8 K/uL 1.6   Mono # 0.3 - 1.0 K/uL 0.5   Eos # 0.0 - 0.5 K/uL 0.1   Baso # 0.00 - 0.20 K/uL 0.04   Immature Grans (Abs) 0.00 - 0.04 K/uL 0.02   nRBC 0 /100 WBC 0   Differential Method  Automated   Sed Rate 0 - 23 mm/Hr 7   PTT Lupus Anticoagulant 32 - 48 sec 36   PTT-LA Mix 32 - 48 sec Not Applicable   APA Isotype IgG 0.00 - 14.99 GPL <9.40   APA Isotype IgM 0.00 - 12.49 MPL <9.40   Beta-2 Glyco 1 IgA <7.0 U/mL 1.4   Beta-2 Glyco 1 IgG <7.0 U/mL <0.8   Beta-2 Glyco 1 IgM <7.0 U/mL <2.4   dRVVT Confirm Negative ratio Not Applicable   dRVVT Incubated 1:1 Mix 33 - 44 sec Not Applicable   DRVVT Screen 33 - 44 sec 30 (L)   Hex Phosph Neut Test Negative  Not Applicable   Reptilase Time <=21.9 sec Not Applicable   Thrombin Time 14.7 - 19.5 sec Not Applicable   PLATELET NEUTRALIZATION APTT Negative  Not Applicable   Lupus Anticoagulant Interpretation  See Note   PT Lupus Anticoagulant 12.0 - 15.5 sec 12.1   PTT Heparin Neutralized 32 - 48 sec Not Applicable   Sodium 136 - 145 mmol/L 139   Potassium 3.5 - 5.1 mmol/L 3.8   Chloride 95 - 110 mmol/L 106   CO2 23 - 29 mmol/L 23   Anion Gap 8 - 16 mmol/L 10   BUN 6 - 20 mg/dL 12   Creatinine 0.5 - 1.4 mg/dL 0.9   eGFR >60 mL/min/1.73 m^2 >60.0   Glucose 70 - 110 mg/dL 97   Calcium 8.7 - 10.5 mg/dL 9.6    Alkaline Phosphatase 55 - 135 U/L 69   PROTEIN TOTAL 6.0 - 8.4 g/dL 7.1   Albumin 3.5 - 5.2 g/dL 4.3   BILIRUBIN TOTAL 0.1 - 1.0 mg/dL 0.5   AST 10 - 40 U/L 40   ALT 10 - 44 U/L 59 (H)   CRP 0.0 - 8.2 mg/L 2.1   CPK 20 - 200 U/L 702 (H)   ELIZABETH Screen Negative <1:80  Negative <1:80   Anti-SSA Antibody 0.00 - 0.99 Ratio 0.07   Anti-SSA Interpretation Negative  Negative   CCP Antibodies <5.0 U/mL 0.6   Complement (C-3) 50 - 180 mg/dL 134   Complement (C-4) 11 - 44 mg/dL 26   Complement,Total, Serum 42 - 95 U/mL 63   IgG 650 - 1600 mg/dL 1000   IgM 50 - 300 mg/dL 42 (L)   IgA 40 - 350 mg/dL 157   Pathologist Interpretation DEQUAN  REVIEWED   Immunofix Interp.  SEE COMMENT   Direct Liam (CASEY)  NEG   Hep B Core Total Ab Non-reactive  Non-reactive   Hep B S Ab mIU/mL  -  37.59  Reactive   Hepatitis B Surface Ag Non-reactive  Non-reactive   Hepatitis C Ab Non-reactive  Non-reactive   Mitogen - Nil IU/mL 9.997   NIL IU/mL 0.25163   TB Gold Plus Negative  Negative   TB1 - Nil IU/mL 0.004   TB2 - Nil IU/mL 0.018   HIV 1/2 Ag/Ab Non-reactive  Non-reactive   RPR Non-reactive  Non-reactive   Aldolase 1.2 - 7.6 U/L 12.0 (H)   (L): Data is abnormally low  (H): Data is abnormally high   Latest Reference Range & Units 05/30/23 11:26   Specimen UA  Urine, Clean Catch   Color, UA Yellow, Straw, Lucero  Yellow   Appearance, UA Clear  Clear   Specific Gravity, UA 1.005 - 1.030  1.025   pH, UA 5.0 - 8.0  6.0   Protein, UA Negative  Negative   Glucose, UA Negative  Negative   Ketones, UA Negative  Negative   Occult Blood UA Negative  Negative   NITRITE UA Negative  Negative   Bilirubin (UA) Negative  Negative   Leukocytes, UA Negative  Negative   Creatinine, Urine 23.0 - 375.0 mg/dL 195.0   Protein, Urine Random 0 - 15 mg/dL 16 (H)   Prot/Creat Ratio, Urine 0.00 - 0.20  0.08   (H): Data is abnormally high      The EMG/NCV of the arms and legs was normal. Would f/u with Dr. Chawla your Neurologist for these symptoms RJQ    "  Impression:  This was a relatively normal electrophysiologic study of the upper and lower extremities.  The left median motor nerve study showed borderline amplitude at the wrist only, which I suspect to be technical given the pattern of normal responses with palm and elbow stimulation and normal needle EMG.  No definitive evidence of an anomalous innervation to explain this.  Aside from this, no evidence of a large fiber neuropathy and no evidence of myopathy seen.  Note that symmetric burning pain can sometimes be seen with small fiber neuropathies, which NCS/EMG will not , so this remains possible.  Small fiber neuropathy is also associated with fibromyalgia.       ___________________________  Jerad Jung D.O.      ELIZABETH+ 12/21/22 no further analysis rest of labs at that time normal(CBC, CMP  CRP 1 mg/L ESR 8 , SPEP TSH HbA1c RF neg, B12 nl  ELIZABETH now -, rest of lupus labs -  Headaches, including Migraine headaches without aura but with photophobia, nausea  Numbness and burning in extremities x several years initially hands now forearms and also legs below knees  Fibromyalgia  WPI 8  SSS 7  Benign hypermobility syndrome Beighton 9/9 criteria  Large varicose veins  Normal peripheral pulses   No murmur  "Cutter" multiple scars wrists, forearms, upper arms, thighs and legs  Dystrophic nails from picking  /80  H/o anxiety/depression  H/o irregular heart beat  Mild anemia  Increased ALT 59   aldolase 12  normal muscle strength 5/5 UE and LE  normal EMG     TSH, free T4 repeat CK, aldolase, ALT, AST, LD, HMGCR, anti-IBM, Myomarker 3, ferritin, Fe/TIBC ordered to be drawn  after one week without exercise.   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 Chawla in Neurology  for paresthesiae,  skin biopsy for SNF, brain MRI  Continue amitriptyline 50mg hs  Trial adding gabapentin 100mg three times daily  RTC 3 months

## 2023-09-26 ENCOUNTER — HOSPITAL ENCOUNTER (EMERGENCY)
Facility: HOSPITAL | Age: 48
Discharge: HOME | End: 2023-09-26
Attending: EMERGENCY MEDICINE
Payer: OTHER GOVERNMENT

## 2023-09-26 VITALS
HEIGHT: 67 IN | OXYGEN SATURATION: 95 % | WEIGHT: 181 LBS | SYSTOLIC BLOOD PRESSURE: 146 MMHG | RESPIRATION RATE: 16 BRPM | BODY MASS INDEX: 28.41 KG/M2 | HEART RATE: 82 BPM | TEMPERATURE: 98 F | DIASTOLIC BLOOD PRESSURE: 87 MMHG

## 2023-09-26 DIAGNOSIS — S90.30XA CONTUSION OF FOOT, UNSPECIFIED LATERALITY, INITIAL ENCOUNTER: ICD-10-CM

## 2023-09-26 DIAGNOSIS — T14.90XA TRAUMA: ICD-10-CM

## 2023-09-26 DIAGNOSIS — S16.1XXA STRAIN OF NECK MUSCLE, INITIAL ENCOUNTER: ICD-10-CM
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
## 2023-09-26 LAB
BACTERIA #/AREA URNS HPF: NEGATIVE /HPF
BILIRUB UR QL STRIP: NEGATIVE
CLARITY UR: CLEAR
COLOR UR: YELLOW
GLUCOSE UR QL STRIP: NEGATIVE
HGB UR QL STRIP: ABNORMAL
HYALINE CASTS #/AREA URNS LPF: 6 /LPF
KETONES UR QL STRIP: ABNORMAL
LEUKOCYTE ESTERASE UR QL STRIP: NEGATIVE
MICROSCOPIC COMMENT: ABNORMAL
NITRITE UR QL STRIP: NEGATIVE
PH UR STRIP: 6 [PH] (ref 5–8)
PROT UR QL STRIP: ABNORMAL
RBC #/AREA URNS HPF: 2 /HPF (ref 0–4)
SP GR UR STRIP: >1.03 (ref 1–1.03)
SQUAMOUS #/AREA URNS HPF: 1 /HPF
URN SPEC COLLECT METH UR: ABNORMAL
UROBILINOGEN UR STRIP-ACNC: NEGATIVE EU/DL
WBC #/AREA URNS HPF: 1 /HPF (ref 0–5)

## 2023-09-26 PROCEDURE — 99285 EMERGENCY DEPT VISIT HI MDM: CPT | Mod: 25

## 2023-09-26 PROCEDURE — 81001 URINALYSIS AUTO W/SCOPE: CPT | Performed by: EMERGENCY MEDICINE

## 2023-09-26 PROCEDURE — 25000003 PHARM REV CODE 250: Performed by: EMERGENCY MEDICINE

## 2023-09-26 RX ORDER — HYDROCODONE BITARTRATE AND ACETAMINOPHEN 10; 325 MG/1; MG/1
1 TABLET ORAL EVERY 6 HOURS PRN
Qty: 12 TABLET | Refills: 0 | Status: SHIPPED | OUTPATIENT
Start: 2023-09-26 | End: 2023-09-29

## 2023-09-26 RX ORDER — METHOCARBAMOL 500 MG/1
1000 TABLET, FILM COATED ORAL 4 TIMES DAILY
Qty: 40 TABLET | Refills: 0 | Status: SHIPPED | OUTPATIENT
Start: 2023-09-26 | End: 2023-10-01

## 2023-09-26 RX ORDER — HYDROCODONE BITARTRATE AND ACETAMINOPHEN 7.5; 325 MG/15ML; MG/15ML
15 SOLUTION ORAL
Status: COMPLETED | OUTPATIENT
Start: 2023-09-26 | End: 2023-09-26

## 2023-09-26 RX ORDER — HYDROCODONE BITARTRATE AND ACETAMINOPHEN 7.5; 325 MG/15ML; MG/15ML
15 SOLUTION ORAL EVERY 6 HOURS PRN
Qty: 120 ML | Refills: 0 | Status: SHIPPED | OUTPATIENT
Start: 2023-09-26

## 2023-09-26 RX ADMIN — HYDROCODONE BITARTRATE AND ACETAMINOPHEN 15 ML: 7.5; 325 SOLUTION ORAL at 10:09
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
## 2023-10-10 ENCOUNTER — CLINICAL SUPPORT (OUTPATIENT)
Dept: CARDIOLOGY | Facility: HOSPITAL | Age: 48
End: 2023-10-10
Payer: OTHER GOVERNMENT

## 2023-10-10 VITALS — HEIGHT: 67 IN | BODY MASS INDEX: 28.41 KG/M2 | WEIGHT: 181 LBS

## 2023-10-10 DIAGNOSIS — M35.7 BENIGN JOINT HYPERMOBILITY SYNDROME: ICD-10-CM

## 2023-10-10 PROCEDURE — 93306 TTE W/DOPPLER COMPLETE: CPT | Mod: PO

## 2023-10-10 PROCEDURE — 93306 ECHO (CUPID ONLY): ICD-10-PCS | Mod: 26,,, | Performed by: INTERNAL MEDICINE

## 2023-10-10 PROCEDURE — 93306 TTE W/DOPPLER COMPLETE: CPT | Mod: 26,,, | Performed by: INTERNAL MEDICINE

## 2023-10-11 LAB
ASCENDING AORTA: 2.83 CM
AV INDEX (PROSTH): 0.71
AV MEAN GRADIENT: 5 MMHG
AV PEAK GRADIENT: 8 MMHG
AV VALVE AREA BY VELOCITY RATIO: 2.56 CM²
AV VALVE AREA: 2.2 CM²
AV VELOCITY RATIO: 0.83
BSA FOR ECHO PROCEDURE: 1.97 M2
CV ECHO LV RWT: 0.46 CM
DOP CALC AO PEAK VEL: 1.43 M/S
DOP CALC AO VTI: 36.8 CM
DOP CALC LVOT AREA: 3.1 CM2
DOP CALC LVOT DIAMETER: 1.98 CM
DOP CALC LVOT PEAK VEL: 1.19 M/S
DOP CALC LVOT STROKE VOLUME: 80.94 CM3
DOP CALCLVOT PEAK VEL VTI: 26.3 CM
E WAVE DECELERATION TIME: 180.28 MSEC
E/A RATIO: 1.73
E/E' RATIO: 8.5 M/S
ECHO LV POSTERIOR WALL: 0.98 CM (ref 0.6–1.1)
EJECTION FRACTION: 65 %
FRACTIONAL SHORTENING: 40 % (ref 28–44)
INTERVENTRICULAR SEPTUM: 1.02 CM (ref 0.6–1.1)
IVRT: 99.9 MSEC
LEFT ATRIUM SIZE: 4.42 CM
LEFT ATRIUM VOLUME INDEX MOD: 23.9 ML/M2
LEFT ATRIUM VOLUME MOD: 46.4 CM3
LEFT INTERNAL DIMENSION IN SYSTOLE: 2.54 CM (ref 2.1–4)
LEFT VENTRICLE DIASTOLIC VOLUME INDEX: 41.98 ML/M2
LEFT VENTRICLE DIASTOLIC VOLUME: 81.44 ML
LEFT VENTRICLE MASS INDEX: 72 G/M2
LEFT VENTRICLE SYSTOLIC VOLUME INDEX: 11.9 ML/M2
LEFT VENTRICLE SYSTOLIC VOLUME: 23.15 ML
LEFT VENTRICULAR INTERNAL DIMENSION IN DIASTOLE: 4.26 CM (ref 3.5–6)
LEFT VENTRICULAR MASS: 140.38 G
LV LATERAL E/E' RATIO: 8.5 M/S
LV SEPTAL E/E' RATIO: 8.5 M/S
LVOT MG: 2.84 MMHG
LVOT MV: 0.79 CM/S
MV PEAK A VEL: 0.59 M/S
MV PEAK E VEL: 1.02 M/S
PISA MRMAX VEL: 4.59 M/S
PISA TR MAX VEL: 2.44 M/S
PULM VEIN S/D RATIO: 1.03
PV PEAK D VEL: 0.77 M/S
PV PEAK S VEL: 0.79 M/S
RA PRESSURE ESTIMATED: 3 MMHG
RA VOL SYS: 28.83 ML
RIGHT ATRIAL AREA: 11.8 CM2
RIGHT VENTRICULAR END-DIASTOLIC DIMENSION: 4.09 CM
RIGHT VENTRICULAR LENGTH IN DIASTOLE (APICAL 4-CHAMBER VIEW): 7.72 CM
RV MID DIAMA: 3.33 CM
RV TB RVSP: 5 MMHG
RV TISSUE DOPPLER FREE WALL SYSTOLIC VELOCITY 1 (APICAL 4 CHAMBER VIEW): 15.05 CM/S
SINUS: 3.04 CM
STJ: 2.61 CM
TDI LATERAL: 0.12 M/S
TDI SEPTAL: 0.12 M/S
TDI: 0.12 M/S
TR MAX PG: 24 MMHG
TRICUSPID ANNULAR PLANE SYSTOLIC EXCURSION: 3.14 CM
TV REST PULMONARY ARTERY PRESSURE: 27 MMHG
Z-SCORE OF LEFT VENTRICULAR DIMENSION IN END DIASTOLE: -2.57
Z-SCORE OF LEFT VENTRICULAR DIMENSION IN END SYSTOLE: -2.28

## 2024-07-03 ENCOUNTER — HOSPITAL ENCOUNTER (EMERGENCY)
Facility: HOSPITAL | Age: 49
Discharge: HOME | End: 2024-07-03
Attending: EMERGENCY MEDICINE
Payer: OTHER GOVERNMENT

## 2024-07-03 VITALS
BODY MASS INDEX: 29.19 KG/M2 | HEIGHT: 67 IN | WEIGHT: 186 LBS | SYSTOLIC BLOOD PRESSURE: 148 MMHG | HEART RATE: 74 BPM | OXYGEN SATURATION: 95 % | RESPIRATION RATE: 18 BRPM | TEMPERATURE: 98 F | DIASTOLIC BLOOD PRESSURE: 91 MMHG

## 2024-07-03 DIAGNOSIS — R10.9 RIGHT FLANK PAIN: Primary | ICD-10-CM

## 2024-07-03 LAB
ALBUMIN SERPL BCP-MCNC: 4.9 G/DL (ref 3.5–5.2)
ALP SERPL-CCNC: 71 U/L (ref 55–135)
ALT SERPL W/O P-5'-P-CCNC: 35 U/L (ref 10–44)
ANION GAP SERPL CALC-SCNC: 11 MMOL/L (ref 8–16)
AST SERPL-CCNC: 33 U/L (ref 10–40)
BACTERIA #/AREA URNS HPF: ABNORMAL /HPF
BASOPHILS # BLD AUTO: 0.05 K/UL (ref 0–0.2)
BASOPHILS NFR BLD: 0.7 % (ref 0–1.9)
BILIRUB SERPL-MCNC: 0.9 MG/DL (ref 0.1–1)
BILIRUB UR QL STRIP: NEGATIVE
BUN SERPL-MCNC: 24 MG/DL (ref 6–20)
CALCIUM SERPL-MCNC: 9.5 MG/DL (ref 8.7–10.5)
CHLORIDE SERPL-SCNC: 100 MMOL/L (ref 95–110)
CLARITY UR: CLEAR
CO2 SERPL-SCNC: 22 MMOL/L (ref 23–29)
COLOR UR: YELLOW
CREAT SERPL-MCNC: 1.2 MG/DL (ref 0.5–1.4)
DIFFERENTIAL METHOD BLD: ABNORMAL
EOSINOPHIL # BLD AUTO: 0.1 K/UL (ref 0–0.5)
EOSINOPHIL NFR BLD: 0.8 % (ref 0–8)
ERYTHROCYTE [DISTWIDTH] IN BLOOD BY AUTOMATED COUNT: 11.8 % (ref 11.5–14.5)
EST. GFR  (NO RACE VARIABLE): >60 ML/MIN/1.73 M^2
GLUCOSE SERPL-MCNC: 92 MG/DL (ref 70–110)
GLUCOSE UR QL STRIP: NEGATIVE
HCT VFR BLD AUTO: 42.6 % (ref 40–54)
HGB BLD-MCNC: 15 G/DL (ref 14–18)
HGB UR QL STRIP: ABNORMAL
HYALINE CASTS #/AREA URNS LPF: 9 /LPF
IMM GRANULOCYTES # BLD AUTO: 0.07 K/UL (ref 0–0.04)
IMM GRANULOCYTES NFR BLD AUTO: 0.9 % (ref 0–0.5)
KETONES UR QL STRIP: ABNORMAL
LEUKOCYTE ESTERASE UR QL STRIP: NEGATIVE
LIPASE SERPL-CCNC: 38 U/L (ref 4–60)
LYMPHOCYTES # BLD AUTO: 1.7 K/UL (ref 1–4.8)
LYMPHOCYTES NFR BLD: 22.1 % (ref 18–48)
MCH RBC QN AUTO: 30.6 PG (ref 27–31)
MCHC RBC AUTO-ENTMCNC: 35.2 G/DL (ref 32–36)
MCV RBC AUTO: 87 FL (ref 82–98)
MICROSCOPIC COMMENT: ABNORMAL
MONOCYTES # BLD AUTO: 0.6 K/UL (ref 0.3–1)
MONOCYTES NFR BLD: 8.4 % (ref 4–15)
NEUTROPHILS # BLD AUTO: 5.1 K/UL (ref 1.8–7.7)
NEUTROPHILS NFR BLD: 67.1 % (ref 38–73)
NITRITE UR QL STRIP: NEGATIVE
NRBC BLD-RTO: 0 /100 WBC
PH UR STRIP: 6 [PH] (ref 5–8)
PLATELET # BLD AUTO: 225 K/UL (ref 150–450)
PMV BLD AUTO: 9.2 FL (ref 9.2–12.9)
POTASSIUM SERPL-SCNC: 4 MMOL/L (ref 3.5–5.1)
PROT SERPL-MCNC: 7.6 G/DL (ref 6–8.4)
PROT UR QL STRIP: ABNORMAL
RBC # BLD AUTO: 4.9 M/UL (ref 4.6–6.2)
RBC #/AREA URNS HPF: 2 /HPF (ref 0–4)
SODIUM SERPL-SCNC: 133 MMOL/L (ref 136–145)
SP GR UR STRIP: 1.03 (ref 1–1.03)
SQUAMOUS #/AREA URNS HPF: 0 /HPF
URN SPEC COLLECT METH UR: ABNORMAL
UROBILINOGEN UR STRIP-ACNC: ABNORMAL EU/DL
WBC # BLD AUTO: 7.63 K/UL (ref 3.9–12.7)
WBC #/AREA URNS HPF: 4 /HPF (ref 0–5)

## 2024-07-03 PROCEDURE — 63600175 PHARM REV CODE 636 W HCPCS: Performed by: EMERGENCY MEDICINE

## 2024-07-03 PROCEDURE — 96375 TX/PRO/DX INJ NEW DRUG ADDON: CPT

## 2024-07-03 PROCEDURE — 96361 HYDRATE IV INFUSION ADD-ON: CPT

## 2024-07-03 PROCEDURE — 99285 EMERGENCY DEPT VISIT HI MDM: CPT | Mod: 25

## 2024-07-03 PROCEDURE — 96374 THER/PROPH/DIAG INJ IV PUSH: CPT

## 2024-07-03 PROCEDURE — 83690 ASSAY OF LIPASE: CPT | Performed by: EMERGENCY MEDICINE

## 2024-07-03 PROCEDURE — 25000003 PHARM REV CODE 250: Performed by: EMERGENCY MEDICINE

## 2024-07-03 PROCEDURE — 81001 URINALYSIS AUTO W/SCOPE: CPT | Performed by: EMERGENCY MEDICINE

## 2024-07-03 PROCEDURE — 80053 COMPREHEN METABOLIC PANEL: CPT | Performed by: EMERGENCY MEDICINE

## 2024-07-03 PROCEDURE — 85025 COMPLETE CBC W/AUTO DIFF WBC: CPT | Performed by: EMERGENCY MEDICINE

## 2024-07-03 RX ORDER — OLMESARTAN MEDOXOMIL AND HYDROCHLOROTHIAZIDE 40/25 40; 25 MG/1; MG/1
1 TABLET ORAL DAILY
COMMUNITY
Start: 2024-06-17 | End: 2024-07-03 | Stop reason: SDUPTHER

## 2024-07-03 RX ORDER — MORPHINE SULFATE 4 MG/ML
4 INJECTION, SOLUTION INTRAMUSCULAR; INTRAVENOUS
Status: COMPLETED | OUTPATIENT
Start: 2024-07-03 | End: 2024-07-03

## 2024-07-03 RX ORDER — METHYLPREDNISOLONE 4 MG/1
4 TABLET ORAL DAILY
COMMUNITY
Start: 2023-09-22 | End: 2024-07-03

## 2024-07-03 RX ORDER — HYDROCHLOROTHIAZIDE 12.5 MG/1
12.5 TABLET ORAL DAILY
COMMUNITY

## 2024-07-03 RX ORDER — ONDANSETRON HYDROCHLORIDE 2 MG/ML
4 INJECTION, SOLUTION INTRAVENOUS
Status: COMPLETED | OUTPATIENT
Start: 2024-07-03 | End: 2024-07-03

## 2024-07-03 RX ORDER — DICYCLOMINE HYDROCHLORIDE 20 MG/1
20 TABLET ORAL
COMMUNITY

## 2024-07-03 RX ORDER — DICLOFENAC SODIUM 10 MG/G
2 GEL TOPICAL 2 TIMES DAILY
COMMUNITY
Start: 2023-09-22 | End: 2024-09-22

## 2024-07-03 RX ADMIN — SODIUM CHLORIDE 1000 ML: 9 INJECTION, SOLUTION INTRAVENOUS at 10:07

## 2024-07-03 RX ADMIN — ONDANSETRON 4 MG: 2 INJECTION INTRAMUSCULAR; INTRAVENOUS at 10:07

## 2024-07-03 RX ADMIN — MORPHINE SULFATE 4 MG: 4 INJECTION, SOLUTION INTRAMUSCULAR; INTRAVENOUS at 10:07

## 2024-07-03 NOTE — ED PROVIDER NOTES
Encounter Date: 7/3/2024       History     Chief Complaint   Patient presents with    Flank Pain     C/o rt flank pain, abd, groin. Changes in urination x few days. + hx renal calculi     This is a 49-year-old male with no significant past medical history except for kidney stones and hypertension who comes in complaining of flank pain.  Patient reports that symptoms started last night.  He reports that he had some difficulty urinating yesterday.  Today began to have pain over his right testicle, groin and right flank area.  Pain was fairly severe at onset.  He became diaphoretic with it.  He works at a doctor's office and received 60 mg of Toradol IM prior to coming into the ER.  He denies any vomiting but reports mild nausea.  He denies any fevers or chills.  He denies any exacerbating or alleviating factors otherwise.      Review of patient's allergies indicates:  No Known Allergies  No past medical history on file.  No past surgical history on file.  No family history on file.     Review of Systems   Constitutional:  Negative for chills and fever.   HENT:  Negative for congestion, sore throat and trouble swallowing.    Respiratory:  Negative for cough and shortness of breath.    Cardiovascular:  Negative for chest pain and palpitations.   Gastrointestinal:  Positive for abdominal pain and nausea. Negative for diarrhea and vomiting.   Genitourinary:  Positive for flank pain and testicular pain. Negative for dysuria.   Musculoskeletal:  Negative for back pain and neck pain.   Neurological:  Negative for weakness, numbness and headaches.   Psychiatric/Behavioral:  Negative for agitation and confusion.    All other systems reviewed and are negative.      Physical Exam     Initial Vitals [07/03/24 1032]   BP Pulse Resp Temp SpO2   (!) 136/99 78 16 97.7 °F (36.5 °C) 96 %      MAP       --         Physical Exam    Nursing note and vitals reviewed.  Constitutional: Vital signs are normal. He appears well-developed and  well-nourished.  Non-toxic appearance. No distress.   HENT:   Head: Normocephalic and atraumatic.   Mouth/Throat: Oropharynx is clear and moist.   Eyes: EOM are normal. Pupils are equal, round, and reactive to light.   Neck: Neck supple.   Normal range of motion.  Cardiovascular:  Normal rate, regular rhythm and intact distal pulses.           Pulmonary/Chest: Breath sounds normal. He has no wheezes.   Abdominal: Abdomen is soft. Bowel sounds are normal. There is no abdominal tenderness.   Genitourinary:    Genitourinary Comments: Normal external male  exam, no testicular tenderness to palpation or swelling noted.     Musculoskeletal:         General: No tenderness or edema. Normal range of motion.      Cervical back: Normal range of motion and neck supple. No rigidity. No muscular tenderness.     Lymphadenopathy:     He has no cervical adenopathy.     He has no axillary adenopathy.   Neurological: He is alert and oriented to person, place, and time. He has normal strength. No cranial nerve deficit or sensory deficit. Gait normal.   Skin: Skin is warm, dry and intact.   Psychiatric: He has a normal mood and affect. His behavior is normal.         ED Course   Procedures  Labs Reviewed   CBC W/ AUTO DIFFERENTIAL - Abnormal; Notable for the following components:       Result Value    Immature Granulocytes 0.9 (*)     Immature Grans (Abs) 0.07 (*)     All other components within normal limits   COMPREHENSIVE METABOLIC PANEL - Abnormal; Notable for the following components:    Sodium 133 (*)     CO2 22 (*)     BUN 24 (*)     All other components within normal limits   URINALYSIS, REFLEX TO URINE CULTURE - Abnormal; Notable for the following components:    Protein, UA 1+ (*)     Ketones, UA 1+ (*)     Occult Blood UA 1+ (*)     Urobilinogen, UA 2.0-3.0 (*)     All other components within normal limits    Narrative:     Specimen Source->Urine   URINALYSIS MICROSCOPIC - Abnormal; Notable for the following components:     Hyaline Casts, UA 9 (*)     All other components within normal limits    Narrative:     Specimen Source->Urine   LIPASE          Imaging Results              CT Abdomen Pelvis  Without Contrast (Final result)  Result time 07/03/24 13:12:58      Final result by Jessica Conn MD (07/03/24 13:12:58)                   Impression:      No acute abdominal or pelvic abnormality.      Electronically signed by: Jessica Conn  Date:    07/03/2024  Time:    13:12               Narrative:      CMS MANDATED QUALITY DATA - CT RADIATION - 436    All CT scans at this facility utilize dose modulation, iterative reconstruction, and/or weight based dosing when appropriate to reduce radiation dose to as low as reasonably achievable.    CLINICAL HISTORY:  ([id number])50 y/o  (1975) M    Flank pain, kidney stone suspected;    TECHNIQUE:  (A#[id number], exam time 7/3/2024 13:03)    CT ABDOMEN PELVIS WITHOUT CONTRAST [id number]    Axial CT images of the abdomen and pelvis were obtained from the dome of the diaphragm to the proximal thighs.    COMPARISON:  None available.    FINDINGS:  Lower Thorax:    The lung bases are clear. The heart is normal in size.    CT Abdomen:    Liver: Relative diffuse low-attenuation liver consistent with hepatic steatosis.    Gallbladder: The gallbladder is within normal limits.    Biliary Tree: No intra or extrahepatic ductal dilation.    Spleen: Normal.    Pancreas: The pancreas is normal.    Adrenal Glands: Normal    Kidneys: The kidneys are normal in imaging appearance without hydronephrosis or hydroureter.    Vasculature: Normal.    Lymph nodes: No abdominal lymphadenopathy is seen.    Intraperitoneal structures: There is no ascites.    Bowel: There are no thick-walled or dilated bowel loops.  The appendix is normal.    Abdominal wall: The abdominal wall and musculature are normal.    Musculoskeletal: Normal.    CT Pelvis:    Bladder: Normal.    Reproductive Organs: Prostate gland is  normal.    Pelvic Lymph nodes: No pelvic lymphadenopathy or pelvic mass is identified.                                       Medications   sodium chloride 0.9% bolus 1,000 mL 1,000 mL (0 mLs Intravenous Stopped 7/3/24 1158)   morphine injection 4 mg (4 mg Intravenous Given 7/3/24 1058)   ondansetron injection 4 mg (4 mg Intravenous Given 7/3/24 1058)     Medical Decision Making  This is a 49-year-old male with history of prior kidney stones and hypertension who comes in complaining of flank pain and testicular pain.  On examination patient's vitals are stable.  On physical exam he has no reproducible tenderness to palpation.  Exam is normal otherwise.    Orders included CBC, CMP, lipase, UA, CT abdomen and pelvis.  He was hydrated.  He was given pain medication.    Comorbidities contributing to patient's presentation include history of hypertension kidney stones.  Acute exacerbation of chronic illness:  Patient's pain could be secondary to recurrent ureterolithiasis.    I reviewed patient's external medical notes.  He was recently evaluated by Cardiology and had an echo done.  His echo showed a normal EF.    Differential diagnosis includes ureterolithiasis, cystitis, pyelonephritis, appendicitis.    Amount and/or Complexity of Data Reviewed  Labs: ordered.     Details: Labs were reviewed and were unremarkable. He did have ketones in his urine but no signs of infection.  Radiology: ordered and independent interpretation performed.     Details: CT abdomen and pelvis showed no ureterolithiasis      Risk  Prescription drug management.  Risk Details: MDM continued:  Patient's workup is unremarkable.  On re-evaluation he is pain-free.  He is tolerating p.o..  Abdomen is benign.  It is unclear what caused his symptoms but at this time he has no indication for any further emergent workup or admission.  Patient will be discharged with close outpatient follow-up.                                          Clinical  Impression:  Final diagnoses:  [R10.9] Right flank pain (Primary)          ED Disposition Condition    Discharge Stable          ED Prescriptions    None       Follow-up Information       Follow up With Specialties Details Why Contact Info    Renae Aparicio MD Orthopedic Surgery, Pediatric Orthopedic Surgery In 1 week To follow-up on your orthopedic injury [address] [place] [address]  [facility] [address]  [phone]               Talia Lebron MD  07/03/24 7990

## 2025-02-18 ENCOUNTER — HOSPITAL ENCOUNTER (EMERGENCY)
Facility: HOSPITAL | Age: 50
Discharge: HOME | End: 2025-02-18
Attending: EMERGENCY MEDICINE
Payer: OTHER GOVERNMENT

## 2025-02-18 VITALS
RESPIRATION RATE: 20 BRPM | WEIGHT: 192 LBS | DIASTOLIC BLOOD PRESSURE: 86 MMHG | HEIGHT: 67 IN | SYSTOLIC BLOOD PRESSURE: 138 MMHG | TEMPERATURE: 98 F | HEART RATE: 90 BPM | OXYGEN SATURATION: 99 % | BODY MASS INDEX: 30.13 KG/M2
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
## 2025-02-18 PROCEDURE — 99284 EMERGENCY DEPT VISIT MOD MDM: CPT

## 2025-02-18 RX ORDER — CLINDAMYCIN PALMITATE HYDROCHLORIDE (PEDIATRIC) 75 MG/5ML
300 SOLUTION ORAL EVERY 8 HOURS
Qty: 420 ML | Refills: 0 | Status: SHIPPED | OUTPATIENT
Start: 2025-02-18 | End: 2025-02-25

## 2025-02-18 RX ORDER — MUPIROCIN 20 MG/G
OINTMENT TOPICAL 2 TIMES DAILY
Qty: 1 EACH | Refills: 0 | Status: SHIPPED | OUTPATIENT
Start: 2025-02-18 | End: 2025-02-28

## 2025-02-18 NOTE — ED PROVIDER NOTES
Encounter Date: 2/18/2025       History     Chief Complaint   Patient presents with    Animal Bite     Pt states he was bit by a dog yesterday and seen at another hospital. Pt started abx last night but states redness is worse today.      49-year-old male with a history of hypertension presents emergency department status post dog bite.  Patient reports that his friend's dog bit his right arm, and left hand.  The patient is right-hand dominant he was seen at a hospital I missed to be yesterday he was prescribed liquid Augmentin because he is unable to swallow pills.  He has had 2 doses of antibiotics.  The patient is here because he reports that he has a little more redness to the palmar aspect and dorsal surface of the hand which was concerning to him.  Patient has had no fevers.  He he looked up on his chart and his last tetanus was 2019, which is up-to-date.  Patient reports the dog's immunizations are up-to-date    The history is provided by the patient.     Review of patient's allergies indicates:  No Known Allergies  No past medical history on file.  No past surgical history on file.  No family history on file.  Social History[1]  Review of Systems   Constitutional:  Negative for fever.   HENT: Negative.     Respiratory: Negative.     Cardiovascular: Negative.    Genitourinary: Negative.    Musculoskeletal:         Puncture wounds to the left hand, right forearm   Skin:  Positive for wound.   Neurological: Negative.    Hematological: Negative.    All other systems reviewed and are negative.      Physical Exam     Initial Vitals [02/18/25 1648]   BP Pulse Resp Temp SpO2   (!) 151/90 86 18 98.4 °F (36.9 °C) 98 %      MAP       --         Physical Exam    Nursing note and vitals reviewed.  Constitutional: He appears well-developed and well-nourished.   HENT:   Head: Normocephalic and atraumatic.   Eyes: Conjunctivae and EOM are normal. Pupils are equal, round, and reactive to light.   Neck: Neck supple.   Normal  range of motion.  Cardiovascular:  Normal rate, regular rhythm, normal heart sounds and intact distal pulses.           Pulmonary/Chest: Breath sounds normal.   Abdominal: Abdomen is soft. Bowel sounds are normal. He exhibits no distension.   Musculoskeletal:         General: Normal range of motion.      Cervical back: Normal range of motion and neck supple.     Neurological: He is alert and oriented to person, place, and time. He has normal strength.   Skin:   Two puncture wounds to the right forearm, puncture wounds to the left hand, on the palmar surface and dorsal surface, there is no puncture wounds to the fingers.  No evidence of lymphangitis patient is able to make a fist, and extend all fingers.                   ED Course   Procedures  Labs Reviewed - No data to display       Imaging Results    None          Medications - No data to display  Medical Decision Making  49-year-old male with a history of hypertension presents emergency department status post dog bite.  Patient reports that his friend's dog bit his right arm, and left hand.  The patient is right-hand dominant he was seen at a hospital I missed to be yesterday he was prescribed liquid Augmentin because he is unable to swallow pills.  He has had 2 doses of antibiotics.  The patient is here because he reports that he has a little more redness to the palmar aspect and dorsal surface of the hand which was concerning to him.  Patient has had no fevers.  He he looked up on his chart and his last tetanus was 2019, which is up-to-date.  Patient reports the dog's immunizations are up-to-date    The history is provided by the patient.     Considerations include but not limited to, wound infection, tenosynovitis    49-year-old male presents emergency department after being bitten by 1 of his friend's dogs yesterday he has 2 puncture wounds to the right arm, and 2 puncture wounds to the left hand.  He was seen at a different hospital he had x-ray imaging  that revealed no foreign body, I reviewed the patient's chart he was discharged home with liquid Augmentin because the patient reports that he is unable to swallow pills.  The patient has no decreased range of motion, he is able to make a complete fist, and extend all fingers freely.  Patient has mild erythema to the palmar aspect of the hand, and over the dorsal aspect that has no significant swelling, or erythema, patient fevers he has no evidence tenosynovitis  The patient has only taken 2 doses of his Augmentin.  The patient was personally evaluated by my attending physician Dr. Mcbride  who agrees with the plan of care.  Patient will be discharged home with Bactrim as well, and Bactroban ointment he will be referred to hand surgery he was given return precautions    Amount and/or Complexity of Data Reviewed  External Data Reviewed: labs, radiology and notes.    Risk  Prescription drug management.                                      Clinical Impression:  Final diagnoses:  [W54.0XXD] Dog bite, subsequent encounter (Primary)          ED Disposition Condition    Discharge Stable          ED Prescriptions       Medication Sig Dispense Start Date End Date Auth. Provider    clindamycin (CLEOCIN) 75 mg/5 mL SolR Take 20 mLs (300 mg total) by mouth every 8 (eight) hours. for 7 days 420 mL 2/18/2025 2/25/2025 Maura Nair FNP    mupirocin (BACTROBAN) 2 % ointment Apply topically 2 (two) times daily. for 10 days 1 each 2/18/2025 2/28/2025 Maura Nair FNP          Follow-up Information       Follow up With Specialties Details Why Contact Jesse Hess MD Hand Surgery Schedule an appointment as soon as possible for a visit in 2 days  [address]  [address]  [facility]  [facility] [address]  [phone]      Wilfred Spence MD Hand Surgery Schedule an appointment as soon as possible for a visit in 2 days  [address]  [address]  [facility]  [address]  [phone]                 [1]         Maura Nair, [facility]  02/18/25 1748

## 2025-02-18 NOTE — DISCHARGE INSTRUCTIONS
Take antibiotics as directed until all gone   Continue Augmentin as directed as well  Please follow up with the hand surgeon  Return for any concerns